# Patient Record
Sex: MALE | Race: WHITE | URBAN - METROPOLITAN AREA
[De-identification: names, ages, dates, MRNs, and addresses within clinical notes are randomized per-mention and may not be internally consistent; named-entity substitution may affect disease eponyms.]

---

## 2017-02-17 DIAGNOSIS — E78.49 OTHER HYPERLIPIDEMIA: Primary | ICD-10-CM

## 2017-02-17 RX ORDER — ATORVASTATIN CALCIUM 40 MG/1
40 TABLET, FILM COATED ORAL DAILY
Qty: 90 TABLET | Refills: 0 | Status: SHIPPED | OUTPATIENT
Start: 2017-02-17 | End: 2017-10-13

## 2017-02-21 RX ORDER — ATORVASTATIN CALCIUM 40 MG/1
TABLET, FILM COATED ORAL
Qty: 90 TABLET | Refills: 0 | Status: SHIPPED | OUTPATIENT
Start: 2017-02-21 | End: 2017-08-23 | Stop reason: SDUPTHER

## 2017-06-19 RX ORDER — ATORVASTATIN CALCIUM 40 MG/1
TABLET, FILM COATED ORAL
Qty: 90 TABLET | Refills: 0 | OUTPATIENT
Start: 2017-06-19

## 2017-08-24 RX ORDER — ATORVASTATIN CALCIUM 40 MG/1
40 TABLET, FILM COATED ORAL DAILY
Qty: 30 TABLET | Refills: 0 | Status: SHIPPED | OUTPATIENT
Start: 2017-08-24 | End: 2017-09-24 | Stop reason: SDUPTHER

## 2017-09-25 RX ORDER — ATORVASTATIN CALCIUM 40 MG/1
TABLET, FILM COATED ORAL
Qty: 30 TABLET | Refills: 0 | Status: SHIPPED | OUTPATIENT
Start: 2017-09-25 | End: 2017-10-13 | Stop reason: SDUPTHER

## 2017-10-13 ENCOUNTER — OFFICE VISIT (OUTPATIENT)
Dept: FAMILY MEDICINE CLINIC | Age: 62
End: 2017-10-13

## 2017-10-13 VITALS
BODY MASS INDEX: 25.23 KG/M2 | SYSTOLIC BLOOD PRESSURE: 128 MMHG | DIASTOLIC BLOOD PRESSURE: 86 MMHG | OXYGEN SATURATION: 98 % | HEIGHT: 66 IN | HEART RATE: 66 BPM | WEIGHT: 157 LBS

## 2017-10-13 DIAGNOSIS — E78.2 MIXED HYPERLIPIDEMIA: Primary | ICD-10-CM

## 2017-10-13 DIAGNOSIS — J06.9 VIRAL URI: ICD-10-CM

## 2017-10-13 DIAGNOSIS — Z12.11 COLON CANCER SCREENING: ICD-10-CM

## 2017-10-13 LAB
ALBUMIN SERPL-MCNC: 4.5 G/DL (ref 3.4–5)
ALP BLD-CCNC: 75 U/L (ref 40–129)
ALT SERPL-CCNC: 23 U/L (ref 10–40)
ANION GAP SERPL CALCULATED.3IONS-SCNC: 14 MMOL/L (ref 3–16)
AST SERPL-CCNC: 26 U/L (ref 15–37)
BASOPHILS ABSOLUTE: 0.1 K/UL (ref 0–0.2)
BASOPHILS RELATIVE PERCENT: 1 %
BILIRUB SERPL-MCNC: 0.8 MG/DL (ref 0–1)
BILIRUBIN DIRECT: <0.2 MG/DL (ref 0–0.3)
BILIRUBIN, INDIRECT: NORMAL MG/DL (ref 0–1)
BUN BLDV-MCNC: 16 MG/DL (ref 7–20)
CALCIUM SERPL-MCNC: 9.5 MG/DL (ref 8.3–10.6)
CHLORIDE BLD-SCNC: 105 MMOL/L (ref 99–110)
CHOLESTEROL, TOTAL: 173 MG/DL (ref 0–199)
CO2: 21 MMOL/L (ref 21–32)
CREAT SERPL-MCNC: 0.8 MG/DL (ref 0.8–1.3)
EOSINOPHILS ABSOLUTE: 0.1 K/UL (ref 0–0.6)
EOSINOPHILS RELATIVE PERCENT: 1.3 %
GFR AFRICAN AMERICAN: >60
GFR NON-AFRICAN AMERICAN: >60
GLUCOSE BLD-MCNC: 93 MG/DL (ref 70–99)
HCT VFR BLD CALC: 50 % (ref 40.5–52.5)
HDLC SERPL-MCNC: 40 MG/DL (ref 40–60)
HEMOGLOBIN: 16.5 G/DL (ref 13.5–17.5)
HEPATITIS C ANTIBODY INTERPRETATION: NORMAL
LDL CHOLESTEROL CALCULATED: 115 MG/DL
LYMPHOCYTES ABSOLUTE: 2.3 K/UL (ref 1–5.1)
LYMPHOCYTES RELATIVE PERCENT: 33.6 %
MCH RBC QN AUTO: 31.3 PG (ref 26–34)
MCHC RBC AUTO-ENTMCNC: 33 G/DL (ref 31–36)
MCV RBC AUTO: 94.7 FL (ref 80–100)
MONOCYTES ABSOLUTE: 0.5 K/UL (ref 0–1.3)
MONOCYTES RELATIVE PERCENT: 7.5 %
NEUTROPHILS ABSOLUTE: 3.8 K/UL (ref 1.7–7.7)
NEUTROPHILS RELATIVE PERCENT: 56.6 %
PDW BLD-RTO: 14.4 % (ref 12.4–15.4)
PLATELET # BLD: 199 K/UL (ref 135–450)
PMV BLD AUTO: 10.9 FL (ref 5–10.5)
POTASSIUM SERPL-SCNC: 4.6 MMOL/L (ref 3.5–5.1)
RBC # BLD: 5.28 M/UL (ref 4.2–5.9)
SODIUM BLD-SCNC: 140 MMOL/L (ref 136–145)
TOTAL CK: 108 U/L (ref 39–308)
TOTAL PROTEIN: 7.4 G/DL (ref 6.4–8.2)
TRIGL SERPL-MCNC: 89 MG/DL (ref 0–150)
VLDLC SERPL CALC-MCNC: 18 MG/DL
WBC # BLD: 6.7 K/UL (ref 4–11)

## 2017-10-13 PROCEDURE — 99214 OFFICE O/P EST MOD 30 MIN: CPT | Performed by: FAMILY MEDICINE

## 2017-10-13 RX ORDER — ATORVASTATIN CALCIUM 40 MG/1
40 TABLET, FILM COATED ORAL DAILY
Qty: 30 TABLET | Refills: 5 | Status: SHIPPED | OUTPATIENT
Start: 2017-10-13 | End: 2017-10-30 | Stop reason: SDUPTHER

## 2017-10-13 RX ORDER — FLUTICASONE PROPIONATE 50 MCG
2 SPRAY, SUSPENSION (ML) NASAL DAILY
Qty: 1 BOTTLE | Refills: 3 | Status: SHIPPED | OUTPATIENT
Start: 2017-10-13 | End: 2018-01-29

## 2017-10-13 ASSESSMENT — PATIENT HEALTH QUESTIONNAIRE - PHQ9
1. LITTLE INTEREST OR PLEASURE IN DOING THINGS: 0
SUM OF ALL RESPONSES TO PHQ QUESTIONS 1-9: 0
SUM OF ALL RESPONSES TO PHQ9 QUESTIONS 1 & 2: 0
2. FEELING DOWN, DEPRESSED OR HOPELESS: 0

## 2017-10-13 NOTE — PROGRESS NOTES
1 Bottle 3    atorvastatin (LIPITOR) 40 MG tablet Take 1 tablet by mouth daily 30 tablet 5    Probiotic Product (PROBIOTIC DAILY PO) Take 1 tablet by mouth daily. No current facility-administered medications for this visit. No Known Allergies        Objective:   Physical Exam   Vitals reviewed. Constitutional: He is oriented to person, place, and time. He appears well-developed and well-nourished. No distress. HENT:   Head: Normocephalic and atraumatic. Right Ear: External ear normal.   Left Ear: External ear normal.   Nose: Nose normal. Mild congestion   Mouth/Throat: Oropharynx is clear and moist. No oropharyngeal exudate. Eyes: Conjunctivae and extraocular motions are normal. Pupils are equal, round, and reactive to light. Right eye exhibits no discharge. Left eye exhibits no discharge. No scleral icterus. Neck: Normal range of motion. Neck supple. No JVD present. No tracheal deviation present. No thyromegaly present. Cardiovascular: Normal rate, regular rhythm, normal heart sounds and intact distal pulses. Exam reveals no gallop and no friction rub. No murmur heard. Pulmonary/Chest: Effort normal and breath sounds normal. No stridor. No respiratory distress. He has no wheezes. He has no rales. He exhibits no tenderness. Abdominal: Soft. Bowel sounds are normal. He exhibits no distension and no mass. No tenderness. He has no rebound and no guarding. Assessment:     1. Mixed hyperlipidemia  atorvastatin (LIPITOR) 40 MG tablet    Basic Metabolic Panel    Hepatic Function Panel    Lipid Panel    CK    Hepatitis C Antibody    HIV Screen   2. Colon cancer screening  [facility] - Mihir Rogers MD ([facility])   3.  Viral URI  fluticasone (FLONASE) 50 MCG/ACT nasal spray    CBC               Plan:   see orders, discussed healthier life style, portion control exercise

## 2017-10-16 LAB — HIV-1 AND HIV-2 ANTIBODIES: NORMAL

## 2017-10-30 DIAGNOSIS — E78.2 MIXED HYPERLIPIDEMIA: ICD-10-CM

## 2017-10-30 RX ORDER — ATORVASTATIN CALCIUM 40 MG/1
40 TABLET, FILM COATED ORAL DAILY
Qty: 90 TABLET | Refills: 1 | Status: SHIPPED | OUTPATIENT
Start: 2017-10-30 | End: 2018-08-04 | Stop reason: SDUPTHER

## 2018-01-11 ENCOUNTER — OFFICE VISIT (OUTPATIENT)
Dept: FAMILY MEDICINE CLINIC | Age: 63
End: 2018-01-11

## 2018-01-11 VITALS
SYSTOLIC BLOOD PRESSURE: 122 MMHG | RESPIRATION RATE: 12 BRPM | DIASTOLIC BLOOD PRESSURE: 84 MMHG | OXYGEN SATURATION: 97 % | TEMPERATURE: 99.6 F | HEART RATE: 84 BPM | WEIGHT: 159 LBS | HEIGHT: 66 IN | BODY MASS INDEX: 25.55 KG/M2

## 2018-01-11 DIAGNOSIS — J11.1 FLU SYNDROME: Primary | ICD-10-CM

## 2018-01-11 LAB
INFLUENZA A ANTIBODY: NORMAL
INFLUENZA B ANTIBODY: NORMAL

## 2018-01-11 PROCEDURE — 87804 INFLUENZA ASSAY W/OPTIC: CPT | Performed by: FAMILY MEDICINE

## 2018-01-11 PROCEDURE — 99214 OFFICE O/P EST MOD 30 MIN: CPT | Performed by: FAMILY MEDICINE

## 2018-01-11 RX ORDER — OSELTAMIVIR PHOSPHATE 75 MG/1
75 CAPSULE ORAL 2 TIMES DAILY
Qty: 10 CAPSULE | Refills: 0 | Status: SHIPPED | OUTPATIENT
Start: 2018-01-11 | End: 2018-01-16

## 2018-01-11 NOTE — LETTER
[address] [place] [address] [address] [facility] [address]  [address] [place]  Phone: [phone]  Fax: [phone]    Emmanuel Chadwick MD        January 11, 2018     Patient: Susan Farrar   YOB: 1955   Date of Visit: 1/11/2018       To Whom it May Concern:    Alva Miner was seen in my clinic on 1/11/2018. He may return to work on 1/15/18. If you have any questions or concerns, please don't hesitate to call.     Sincerely,         Emmanuel Chadwick MD

## 2018-01-22 ENCOUNTER — PATIENT MESSAGE (OUTPATIENT)
Dept: FAMILY MEDICINE CLINIC | Age: 63
End: 2018-01-22

## 2018-01-29 ENCOUNTER — OFFICE VISIT (OUTPATIENT)
Dept: FAMILY MEDICINE CLINIC | Age: 63
End: 2018-01-29

## 2018-01-29 VITALS
WEIGHT: 155.2 LBS | OXYGEN SATURATION: 98 % | HEART RATE: 71 BPM | SYSTOLIC BLOOD PRESSURE: 134 MMHG | BODY MASS INDEX: 25.05 KG/M2 | DIASTOLIC BLOOD PRESSURE: 96 MMHG

## 2018-01-29 DIAGNOSIS — L72.3 SEBACEOUS CYST: Primary | ICD-10-CM

## 2018-01-29 PROCEDURE — 99212 OFFICE O/P EST SF 10 MIN: CPT | Performed by: FAMILY MEDICINE

## 2018-01-29 ASSESSMENT — ENCOUNTER SYMPTOMS
RESPIRATORY NEGATIVE: 1
EYES NEGATIVE: 1
GASTROINTESTINAL NEGATIVE: 1

## 2018-08-04 DIAGNOSIS — E78.2 MIXED HYPERLIPIDEMIA: ICD-10-CM

## 2018-08-06 RX ORDER — ATORVASTATIN CALCIUM 40 MG/1
40 TABLET, FILM COATED ORAL DAILY
Qty: 30 TABLET | Refills: 2 | Status: SHIPPED | OUTPATIENT
Start: 2018-08-06 | End: 2019-01-20 | Stop reason: SDUPTHER

## 2019-01-20 DIAGNOSIS — E78.2 MIXED HYPERLIPIDEMIA: ICD-10-CM

## 2019-01-21 RX ORDER — ATORVASTATIN CALCIUM 40 MG/1
40 TABLET, FILM COATED ORAL DAILY
Qty: 30 TABLET | Refills: 2 | Status: SHIPPED | OUTPATIENT
Start: 2019-01-21 | End: 2019-02-25 | Stop reason: SDUPTHER

## 2019-02-25 ENCOUNTER — OFFICE VISIT (OUTPATIENT)
Dept: FAMILY MEDICINE CLINIC | Age: 64
End: 2019-02-25
Payer: COMMERCIAL

## 2019-02-25 VITALS
DIASTOLIC BLOOD PRESSURE: 84 MMHG | BODY MASS INDEX: 25.6 KG/M2 | SYSTOLIC BLOOD PRESSURE: 128 MMHG | HEART RATE: 78 BPM | WEIGHT: 158.6 LBS | RESPIRATION RATE: 14 BRPM | OXYGEN SATURATION: 97 %

## 2019-02-25 DIAGNOSIS — J06.9 VIRAL URI: Primary | ICD-10-CM

## 2019-02-25 DIAGNOSIS — E78.2 MIXED HYPERLIPIDEMIA: ICD-10-CM

## 2019-02-25 DIAGNOSIS — Z12.11 COLON CANCER SCREENING: ICD-10-CM

## 2019-02-25 LAB
ALBUMIN SERPL-MCNC: 4.5 G/DL (ref 3.4–5)
ALP BLD-CCNC: 68 U/L (ref 40–129)
ALT SERPL-CCNC: 24 U/L (ref 10–40)
ANION GAP SERPL CALCULATED.3IONS-SCNC: 13 MMOL/L (ref 3–16)
AST SERPL-CCNC: 31 U/L (ref 15–37)
BILIRUB SERPL-MCNC: 0.9 MG/DL (ref 0–1)
BILIRUBIN DIRECT: <0.2 MG/DL (ref 0–0.3)
BILIRUBIN, INDIRECT: NORMAL MG/DL (ref 0–1)
BUN BLDV-MCNC: 14 MG/DL (ref 7–20)
CALCIUM SERPL-MCNC: 9.7 MG/DL (ref 8.3–10.6)
CHLORIDE BLD-SCNC: 107 MMOL/L (ref 99–110)
CHOLESTEROL, TOTAL: 171 MG/DL (ref 0–199)
CO2: 23 MMOL/L (ref 21–32)
CREAT SERPL-MCNC: 0.9 MG/DL (ref 0.8–1.3)
GFR AFRICAN AMERICAN: >60
GFR NON-AFRICAN AMERICAN: >60
GLUCOSE BLD-MCNC: 100 MG/DL (ref 70–99)
HCT VFR BLD CALC: 46.3 % (ref 40.5–52.5)
HDLC SERPL-MCNC: 41 MG/DL (ref 40–60)
HEMOGLOBIN: 15.5 G/DL (ref 13.5–17.5)
LDL CHOLESTEROL CALCULATED: 112 MG/DL
MCH RBC QN AUTO: 30.7 PG (ref 26–34)
MCHC RBC AUTO-ENTMCNC: 33.4 G/DL (ref 31–36)
MCV RBC AUTO: 92.1 FL (ref 80–100)
PDW BLD-RTO: 13.6 % (ref 12.4–15.4)
PLATELET # BLD: 208 K/UL (ref 135–450)
PMV BLD AUTO: 10.2 FL (ref 5–10.5)
POTASSIUM SERPL-SCNC: 4.4 MMOL/L (ref 3.5–5.1)
RBC # BLD: 5.03 M/UL (ref 4.2–5.9)
SODIUM BLD-SCNC: 143 MMOL/L (ref 136–145)
TOTAL PROTEIN: 7.3 G/DL (ref 6.4–8.2)
TRIGL SERPL-MCNC: 90 MG/DL (ref 0–150)
VLDLC SERPL CALC-MCNC: 18 MG/DL
WBC # BLD: 5.5 K/UL (ref 4–11)

## 2019-02-25 PROCEDURE — 36415 COLL VENOUS BLD VENIPUNCTURE: CPT | Performed by: FAMILY MEDICINE

## 2019-02-25 PROCEDURE — 99213 OFFICE O/P EST LOW 20 MIN: CPT | Performed by: FAMILY MEDICINE

## 2019-02-25 RX ORDER — FLUTICASONE PROPIONATE 50 MCG
2 SPRAY, SUSPENSION (ML) NASAL DAILY
Qty: 1 BOTTLE | Refills: 1 | Status: SHIPPED | OUTPATIENT
Start: 2019-02-25 | End: 2019-06-24 | Stop reason: SDUPTHER

## 2019-02-25 RX ORDER — ATORVASTATIN CALCIUM 40 MG/1
40 TABLET, FILM COATED ORAL DAILY
Qty: 30 TABLET | Refills: 5 | Status: SHIPPED | OUTPATIENT
Start: 2019-02-25 | End: 2020-01-23

## 2019-02-25 ASSESSMENT — PATIENT HEALTH QUESTIONNAIRE - PHQ9
SUM OF ALL RESPONSES TO PHQ QUESTIONS 1-9: 0
1. LITTLE INTEREST OR PLEASURE IN DOING THINGS: 0
2. FEELING DOWN, DEPRESSED OR HOPELESS: 0
SUM OF ALL RESPONSES TO PHQ9 QUESTIONS 1 & 2: 0
SUM OF ALL RESPONSES TO PHQ QUESTIONS 1-9: 0

## 2019-03-05 ENCOUNTER — TELEPHONE (OUTPATIENT)
Dept: FAMILY MEDICINE CLINIC | Age: 64
End: 2019-03-05

## 2019-03-05 RX ORDER — AMOXICILLIN AND CLAVULANATE POTASSIUM 875; 125 MG/1; MG/1
1 TABLET, FILM COATED ORAL 2 TIMES DAILY
Qty: 20 TABLET | Refills: 0 | Status: SHIPPED | OUTPATIENT
Start: 2019-03-05 | End: 2019-03-15

## 2019-06-13 ENCOUNTER — TELEPHONE (OUTPATIENT)
Dept: FAMILY MEDICINE CLINIC | Age: 64
End: 2019-06-13

## 2019-06-13 NOTE — TELEPHONE ENCOUNTER
Pt is returning a call from [employer]. Please return his call at the earliest convenience.     LOV 2/25/19

## 2019-06-13 NOTE — TELEPHONE ENCOUNTER
Follow up on Cologuard     [phone]   call office, has patient received Cologuard, and returned to [facility]

## 2019-06-24 DIAGNOSIS — J06.9 VIRAL URI: ICD-10-CM

## 2019-06-24 RX ORDER — FLUTICASONE PROPIONATE 50 MCG
SPRAY, SUSPENSION (ML) NASAL
Qty: 1 BOTTLE | Refills: 1 | Status: SHIPPED | OUTPATIENT
Start: 2019-06-24

## 2019-06-24 NOTE — TELEPHONE ENCOUNTER
Medication:   Requested Prescriptions     Pending Prescriptions Disp Refills    fluticasone (FLONASE) 50 MCG/ACT nasal spray [Pharmacy Med Name: FLUTICASONE PROP 50 MCG SPRAY]  1     Sig: SPRAY 2 SPRAYS INTO EACH NOSTRIL EVERY DAY        Last Filled:      Patient Phone Number: [phone] (home) [phone] (work)    Last appt: 2/25/2019   Next appt: Visit date not found    Last OARRS: No flowsheet data found.     Preferred Pharmacy:   [facility]/pharmacy [place] [address], [address] [place] [address]  [address] [place] [address] [address] [place] [address] [address]  Phone: [phone] Fax: [phone]    [facility]pharmacy [facility] [address] [phone] [facility] [phone]  [facility] [address]  Phone: [phone] Fax: [phone]

## 2019-10-08 ENCOUNTER — OFFICE VISIT (OUTPATIENT)
Dept: ORTHOPEDIC SURGERY | Age: 64
End: 2019-10-08
Payer: COMMERCIAL

## 2019-10-08 VITALS — DIASTOLIC BLOOD PRESSURE: 78 MMHG | HEART RATE: 68 BPM | SYSTOLIC BLOOD PRESSURE: 135 MMHG

## 2019-10-08 DIAGNOSIS — M54.12 CERVICAL RADICULOPATHY AT C7: ICD-10-CM

## 2019-10-08 DIAGNOSIS — M54.2 CERVICAL PAIN: ICD-10-CM

## 2019-10-08 DIAGNOSIS — S16.1XXA CERVICAL STRAIN, ACUTE, INITIAL ENCOUNTER: ICD-10-CM

## 2019-10-08 DIAGNOSIS — M25.511 RIGHT SHOULDER PAIN, UNSPECIFIED CHRONICITY: ICD-10-CM

## 2019-10-08 DIAGNOSIS — M50.20 DISCOGENIC SYNDROME, CERVICAL: Primary | ICD-10-CM

## 2019-10-08 PROCEDURE — 99203 OFFICE O/P NEW LOW 30 MIN: CPT | Performed by: INTERNAL MEDICINE

## 2019-10-08 RX ORDER — PREDNISONE 50 MG/1
50 TABLET ORAL DAILY
Qty: 6 TABLET | Refills: 0 | Status: SHIPPED | OUTPATIENT
Start: 2019-10-08 | End: 2019-10-14

## 2019-10-08 RX ORDER — NAPROXEN 250 MG/1
TABLET ORAL
Refills: 0 | COMMUNITY
Start: 2019-10-02

## 2019-10-08 RX ORDER — TRAMADOL HYDROCHLORIDE 50 MG/1
50 TABLET ORAL EVERY 6 HOURS PRN
Qty: 20 TABLET | Refills: 0 | Status: SHIPPED | OUTPATIENT
Start: 2019-10-08 | End: 2019-10-15

## 2019-10-08 RX ORDER — CYCLOBENZAPRINE HCL 10 MG
10 TABLET ORAL NIGHTLY PRN
Qty: 30 TABLET | Refills: 1 | Status: SHIPPED | OUTPATIENT
Start: 2019-10-08 | End: 2020-01-07 | Stop reason: SDUPTHER

## 2019-10-11 DIAGNOSIS — M50.20 DISCOGENIC SYNDROME, CERVICAL: ICD-10-CM

## 2019-10-11 DIAGNOSIS — M54.12 CERVICAL RADICULOPATHY AT C7: Primary | ICD-10-CM

## 2019-10-11 DIAGNOSIS — S16.1XXA CERVICAL STRAIN, ACUTE, INITIAL ENCOUNTER: ICD-10-CM

## 2019-10-22 ENCOUNTER — OFFICE VISIT (OUTPATIENT)
Dept: ORTHOPEDIC SURGERY | Age: 64
End: 2019-10-22
Payer: COMMERCIAL

## 2019-10-22 ENCOUNTER — TELEPHONE (OUTPATIENT)
Dept: ORTHOPEDIC SURGERY | Age: 64
End: 2019-10-22

## 2019-10-22 DIAGNOSIS — M50.20 DISCOGENIC SYNDROME, CERVICAL: Primary | ICD-10-CM

## 2019-10-22 DIAGNOSIS — S16.1XXD CERVICAL STRAIN, SUBSEQUENT ENCOUNTER: ICD-10-CM

## 2019-10-22 DIAGNOSIS — M54.12 CERVICAL RADICULOPATHY AT C7: ICD-10-CM

## 2019-10-22 PROCEDURE — 99213 OFFICE O/P EST LOW 20 MIN: CPT | Performed by: INTERNAL MEDICINE

## 2019-10-22 RX ORDER — TRAMADOL HYDROCHLORIDE 50 MG/1
50 TABLET ORAL EVERY 6 HOURS PRN
Qty: 20 TABLET | Refills: 0 | Status: SHIPPED | OUTPATIENT
Start: 2019-10-22 | End: 2019-10-29

## 2019-10-25 DIAGNOSIS — S16.1XXD CERVICAL STRAIN, SUBSEQUENT ENCOUNTER: ICD-10-CM

## 2019-10-25 DIAGNOSIS — M54.12 CERVICAL RADICULOPATHY AT C7: Primary | ICD-10-CM

## 2019-10-25 DIAGNOSIS — M50.20 DISCOGENIC SYNDROME, CERVICAL: ICD-10-CM

## 2019-11-04 ENCOUNTER — TELEPHONE (OUTPATIENT)
Dept: ORTHOPEDIC SURGERY | Age: 64
End: 2019-11-04

## 2019-12-03 ENCOUNTER — TELEPHONE (OUTPATIENT)
Dept: ORTHOPEDIC SURGERY | Age: 64
End: 2019-12-03

## 2019-12-04 ENCOUNTER — OFFICE VISIT (OUTPATIENT)
Dept: ORTHOPEDIC SURGERY | Age: 64
End: 2019-12-04
Payer: COMMERCIAL

## 2019-12-04 VITALS — WEIGHT: 158.51 LBS | HEIGHT: 66 IN | BODY MASS INDEX: 25.47 KG/M2

## 2019-12-04 DIAGNOSIS — M50.20 DISCOGENIC SYNDROME, CERVICAL: ICD-10-CM

## 2019-12-04 DIAGNOSIS — M54.12 CERVICAL RADICULOPATHY AT C7: ICD-10-CM

## 2019-12-04 DIAGNOSIS — S16.1XXD CERVICAL STRAIN, SUBSEQUENT ENCOUNTER: Primary | ICD-10-CM

## 2019-12-04 PROCEDURE — 99213 OFFICE O/P EST LOW 20 MIN: CPT | Performed by: INTERNAL MEDICINE

## 2019-12-05 ENCOUNTER — HOSPITAL ENCOUNTER (OUTPATIENT)
Dept: PHYSICAL THERAPY | Age: 64
Setting detail: THERAPIES SERIES
Discharge: HOME | End: 2019-12-05
Payer: COMMERCIAL

## 2019-12-05 PROCEDURE — 97012 MECHANICAL TRACTION THERAPY: CPT

## 2019-12-05 PROCEDURE — 97161 PT EVAL LOW COMPLEX 20 MIN: CPT

## 2019-12-05 PROCEDURE — 97110 THERAPEUTIC EXERCISES: CPT

## 2019-12-05 PROCEDURE — 97140 MANUAL THERAPY 1/> REGIONS: CPT

## 2019-12-09 ENCOUNTER — HOSPITAL ENCOUNTER (OUTPATIENT)
Dept: PHYSICAL THERAPY | Age: 64
Setting detail: THERAPIES SERIES
Discharge: HOME | End: 2019-12-09
Payer: COMMERCIAL

## 2019-12-09 PROCEDURE — 97110 THERAPEUTIC EXERCISES: CPT

## 2019-12-09 PROCEDURE — 97012 MECHANICAL TRACTION THERAPY: CPT

## 2019-12-09 PROCEDURE — 97140 MANUAL THERAPY 1/> REGIONS: CPT

## 2019-12-12 ENCOUNTER — HOSPITAL ENCOUNTER (OUTPATIENT)
Dept: PHYSICAL THERAPY | Age: 64
Setting detail: THERAPIES SERIES
Discharge: HOME | End: 2019-12-12
Payer: COMMERCIAL

## 2019-12-12 PROCEDURE — 97140 MANUAL THERAPY 1/> REGIONS: CPT

## 2019-12-12 PROCEDURE — 97012 MECHANICAL TRACTION THERAPY: CPT

## 2019-12-16 ENCOUNTER — HOSPITAL ENCOUNTER (OUTPATIENT)
Dept: PHYSICAL THERAPY | Age: 64
Setting detail: THERAPIES SERIES
Discharge: HOME | End: 2019-12-16
Payer: COMMERCIAL

## 2019-12-16 PROCEDURE — 97140 MANUAL THERAPY 1/> REGIONS: CPT

## 2019-12-16 PROCEDURE — 97110 THERAPEUTIC EXERCISES: CPT

## 2019-12-16 PROCEDURE — 97012 MECHANICAL TRACTION THERAPY: CPT

## 2019-12-18 ENCOUNTER — HOSPITAL ENCOUNTER (OUTPATIENT)
Dept: PHYSICAL THERAPY | Age: 64
Setting detail: THERAPIES SERIES
Discharge: HOME | End: 2019-12-18
Payer: COMMERCIAL

## 2019-12-18 PROCEDURE — 97140 MANUAL THERAPY 1/> REGIONS: CPT

## 2019-12-18 PROCEDURE — 97110 THERAPEUTIC EXERCISES: CPT

## 2019-12-18 PROCEDURE — 97012 MECHANICAL TRACTION THERAPY: CPT

## 2019-12-31 ENCOUNTER — HOSPITAL ENCOUNTER (OUTPATIENT)
Dept: PHYSICAL THERAPY | Age: 64
Setting detail: THERAPIES SERIES
Discharge: HOME | End: 2019-12-31
Payer: COMMERCIAL

## 2019-12-31 PROCEDURE — 97140 MANUAL THERAPY 1/> REGIONS: CPT

## 2019-12-31 PROCEDURE — 97110 THERAPEUTIC EXERCISES: CPT

## 2019-12-31 PROCEDURE — 97012 MECHANICAL TRACTION THERAPY: CPT

## 2019-12-31 NOTE — FLOWSHEET NOTE
Seat Table slides/Wall Slides       Seated HH  Depression       No Money Lime green 2 10 Monitor L shoulder pain   Supine chin tucks  5\" 20    Cervical snag  1 10 To L only    Cat/camel  1 10    UT stretch  30\" 3 B   Scap retractions     Corner stretch  10\" 5x    Open books  1 10 B   1/2 foam roll pec stretch  2'     Thoracic extension over foam roller  1 10 2 positions   R wrist extensors stretch  HEP          Manual Intervention 20'       Shld /GH Mobs       Post Cap mobs       Thoracic PA mobs 5'      CT MT/Mobs 8'   sideglides & PA mobs at CTJ   Seated CT distraction manip 2'   L arm by side due to shoulder pain   Manual cervical traction 5'             NMR re-education       T-spine Ext       GH depres/compress       Yuridia Scap Bio       Scap/GH NMR       Body blade       Wall ball roll       Wall Ball bounce                  Therapeutic Exercise and NMR EXR  [x] (65041) Provided verbal/tactile cueing for activities related to strengthening, flexibility, endurance, ROM  for improvements in scapular, scapulothoracic and UE control with self care, reaching, carrying, lifting, house/yardwork, driving/computer work.    [] (39858) Provided verbal/tactile cueing for activities related to improving balance, coordination, kinesthetic sense, posture, motor skill, proprioception  to assist with  scapular, scapulothoracic and UE control with self care, reaching, carrying, lifting, house/yardwork, driving/computer work. Therapeutic Activities:    [] (72538 or 66429) Provided verbal/tactile cueing for activities related to improving balance, coordination, kinesthetic sense, posture, motor skill, proprioception and motor activation to allow for proper function of scapular, scapulothoracic and UE control with self care, carrying, lifting, driving/computer work.      Home Exercise Program:    [x] (14648) Reviewed/Progressed HEP activities related to strengthening, flexibility, endurance, ROM of scapular, scapulothoracic and UE control with self care, reaching, carrying, lifting, house/yardwork, driving/computer work  [] (85901) Reviewed/Progressed HEP activities related to improving balance, coordination, kinesthetic sense, posture, motor skill, proprioception of scapular, scapulothoracic and UE control with self care, reaching, carrying, lifting, house/yardwork, driving/computer work      Manual Treatments:  PROM / STM / Oscillations-Mobs:  G-I, II, III, IV (PA's, Inf., Post.)  [x] (96955) Provided manual therapy to mobilize soft tissue/joints of cervical/CT, scapular GHJ and UE for the purpose of modulating pain, promoting relaxation,  increasing ROM, reducing/eliminating soft tissue swelling/inflammation/restriction, improving soft tissue extensibility and allowing for proper ROM for normal function with self care, reaching, carrying, lifting, house/yardwork, driving/computer work    Modalities:  10' mechanical cervical traction - 15#/10#, 30\" hold/15\" release, 24 degrees    Charges:  Timed Code Treatment Minutes: 50   Total Treatment Minutes: 60       [] EVAL (LOW) 26927 (typically 20 minutes face-to-face)  [] EVAL (MOD) 15597 (typically 30 minutes face-to-face)  [] EVAL (HIGH) 56490 (typically 45 minutes face-to-face)  [] RE-EVAL     [x] VX(43049) x2     [] IONTO (21183)  [] NMR (86930) x     [] VASO (92628)  [x] Manual ([id number]) x 1    [] Other:  [] TA (16873)x     [x] Mech Traction (49999)  [] ES(attended) (03095)     [] ES (un) (20167): If [facility] Please Indicate Time In/Out  CPT Code Time in Time out   33301 7:00 7:20   38155 7:15 7:50   60810 7:50 8:00                 GOALS:  Patient stated goal: \"To be painfree and to have full movement\"  []? Progressing: []? Met: []? Not Met: []? Adjusted     Therapist goals for Patient:   Short Term Goals: To be achieved in: 2 weeks  1. Independent in HEP and progression per patient tolerance, in order to prevent re-injury. []? Progressing: []? Met: []? Not Met: []? Adjusted     2.  Patient

## 2020-01-02 ENCOUNTER — HOSPITAL ENCOUNTER (OUTPATIENT)
Dept: PHYSICAL THERAPY | Age: 65
Setting detail: THERAPIES SERIES
Discharge: HOME | End: 2020-01-02
Payer: COMMERCIAL

## 2020-01-02 PROCEDURE — 97140 MANUAL THERAPY 1/> REGIONS: CPT

## 2020-01-02 PROCEDURE — 97110 THERAPEUTIC EXERCISES: CPT

## 2020-01-02 PROCEDURE — 97012 MECHANICAL TRACTION THERAPY: CPT

## 2020-01-02 NOTE — FLOWSHEET NOTE
Depression       No Money Lime green 2 10 Monitor L shoulder pain   Supine chin tucks  5\" 20    Cervical snag  1 10 To L only    Cat/camel  1 10    UT stretch  30\" 3 B   Scap retractions     Corner stretch  10\" 5x    Open books  1 10 B   1/2 foam roll pec stretch  2'     Thoracic extension over foam roller  1 10 2 positions   R wrist extensors stretch  HEP   Thread the needle  1 10 In quadruped          Manual Intervention 20'       Shld /GH Mobs       Post Cap mobs       Thoracic PA mobs 5'      CT MT/Mobs 8'   sideglides & PA mobs at CTJ   Seated CT distraction manip 2'   L arm by side due to shoulder pain   Manual cervical traction 5'             NMR re-education       T-spine Ext       GH depres/compress       Yuridia Scap Bio       Scap/GH NMR       Body blade       Wall ball roll       Wall Ball bounce                  Therapeutic Exercise and NMR EXR  [x] (07495) Provided verbal/tactile cueing for activities related to strengthening, flexibility, endurance, ROM  for improvements in scapular, scapulothoracic and UE control with self care, reaching, carrying, lifting, house/yardwork, driving/computer work.    [] (86636) Provided verbal/tactile cueing for activities related to improving balance, coordination, kinesthetic sense, posture, motor skill, proprioception  to assist with  scapular, scapulothoracic and UE control with self care, reaching, carrying, lifting, house/yardwork, driving/computer work. Therapeutic Activities:    [] (17183 or 78091) Provided verbal/tactile cueing for activities related to improving balance, coordination, kinesthetic sense, posture, motor skill, proprioception and motor activation to allow for proper function of scapular, scapulothoracic and UE control with self care, carrying, lifting, driving/computer work.      Home Exercise Program:    [x] (93258) Reviewed/Progressed HEP activities related to strengthening, flexibility, endurance, ROM of scapular, scapulothoracic and UE control with self care, reaching, carrying, lifting, house/yardwork, driving/computer work  [] (89133) Reviewed/Progressed HEP activities related to improving balance, coordination, kinesthetic sense, posture, motor skill, proprioception of scapular, scapulothoracic and UE control with self care, reaching, carrying, lifting, house/yardwork, driving/computer work      Manual Treatments:  PROM / STM / Oscillations-Mobs:  G-I, II, III, IV (PA's, Inf., Post.)  [x] (68764) Provided manual therapy to mobilize soft tissue/joints of cervical/CT, scapular GHJ and UE for the purpose of modulating pain, promoting relaxation,  increasing ROM, reducing/eliminating soft tissue swelling/inflammation/restriction, improving soft tissue extensibility and allowing for proper ROM for normal function with self care, reaching, carrying, lifting, house/yardwork, driving/computer work    Modalities:  10' mechanical cervical traction - 15#/10#, 30\" hold/15\" release, 24 degrees    Charges:  Timed Code Treatment Minutes: 50   Total Treatment Minutes: 60       [] EVAL (LOW) 32564 (typically 20 minutes face-to-face)  [] EVAL (MOD) 71289 (typically 30 minutes face-to-face)  [] EVAL (HIGH) 68715 (typically 45 minutes face-to-face)  [] RE-EVAL     [x] LN(23727) x2     [] IONTO (59743)  [] NMR (34931) x     [] VASO (14014)  [x] Manual (48304) x 1    [] Other:  [] TA (12546)x     [x] Mech Traction (48939)  [] ES(attended) (72459)     [] ES (un) (30674): If [facility] Please Indicate Time In/Out  CPT Code Time in Time out   09081 2:00 2:20   18667 2:20 2:50   49781 2:50 3:00                 GOALS:  Patient stated goal: \"To be painfree and to have full movement\"  [x]? Progressing: []? Met: []? Not Met: []? Adjusted     Therapist goals for Patient:   Short Term Goals: To be achieved in: 2 weeks  1. Independent in HEP and progression per patient tolerance, in order to prevent re-injury. []? Progressing: [x]? Met: []? Not Met: []? Adjusted     2.  Patient

## 2020-01-07 ENCOUNTER — OFFICE VISIT (OUTPATIENT)
Dept: ORTHOPEDIC SURGERY | Age: 65
End: 2020-01-07
Payer: COMMERCIAL

## 2020-01-07 VITALS — WEIGHT: 158.51 LBS | HEIGHT: 66 IN | BODY MASS INDEX: 25.47 KG/M2

## 2020-01-07 PROCEDURE — 99213 OFFICE O/P EST LOW 20 MIN: CPT | Performed by: INTERNAL MEDICINE

## 2020-01-07 RX ORDER — CYCLOBENZAPRINE HCL 10 MG
10 TABLET ORAL NIGHTLY PRN
Qty: 30 TABLET | Refills: 1 | Status: SHIPPED | OUTPATIENT
Start: 2020-01-07 | End: 2021-01-11 | Stop reason: SDUPTHER

## 2020-01-07 NOTE — PROGRESS NOTES
Chief Complaint:   Chief Complaint   Patient presents with    Neck Pain     [facility] f/u neck/arm, doing better, PT helpful          History of Present Illness:       Patient is a 71 y.o. male returns follow up for the above complaint. The patient was last seen approximately 5 weeksago. The symptoms are improving since the last visit. The patient has had no further testing for the problem. He has completed 7 sessions of PT and is noted definite therapeutic benefit from this    There has been centralization of his symptoms and only transient brachialgia now. 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 with respect to the arm pain and neck pain    He estimates he is 70% improved overall    Denies any new onset or progressive weakness of the right upper extremity     Past Medical History:        Past Medical History:   Diagnosis Date    CERVICAL RADICULOPATHY     GERD (gastroesophageal reflux disease)     Hyperlipidemia     IBS (irritable bowel syndrome)         Present Medications:         Current Outpatient Medications   Medication Sig Dispense Refill    naproxen (NAPROSYN) 250 MG tablet TAKE 1 TABLET BY MOUTH TWICE A DAY AS NEEDED FOR PAIN  0    cyclobenzaprine (FLEXERIL) 10 MG tablet Take 1 tablet by mouth nightly as needed for Muscle spasms 30 tablet 1    fluticasone (FLONASE) 50 MCG/ACT nasal spray SPRAY 2 SPRAYS INTO EACH NOSTRIL EVERY DAY 1 Bottle 1    atorvastatin (LIPITOR) 40 MG tablet Take 1 tablet by mouth daily 30 tablet 5    Probiotic Product (PROBIOTIC DAILY PO) Take 1 tablet by mouth daily. No current facility-administered medications for this visit. Allergies:      No Known Allergies        Review of Systems:    Pertinent items are noted in HPI         Vital Signs: There were no vitals filed for this visit.      General Exam:     Constitutional: Patient is adequately groomed with no evidence of malnutrition    Physical Exam: Cervical neck      Primary Exam:    Inspection: No deformity atrophy appreciable curvature      Palpation: No focal trigger point tenderness      Range of Motion: Moderate restriction with extension mild restriction in all other ranges low-grade discomfort with endrange rotation-left      Strength: Normal upper extremity negative      Special Tests: Negative      Skin: There are no rashes, ulcerations or lesions. Gait: Nonantalgic     Neurologic -Light touch sensation and manual muscle testing is normal C5-C8 . Biceps and triceps reflexes are symmetric and +2. Additional Comments:        Additional Examinations:                   Office Imaging Results/Procedures PerformedToday:           Office Procedures:   No orders of the defined types were placed in this encounter. Other Outside Imaging and Testing Personally Reviewed:    No results found. Assessment   Impression: . Encounter Diagnoses   Name Primary?  Cervical strain, subsequent encounter Yes    Cervical radiculopathy at C7     Right cervical radiculopathy         Resolving cervical radiculopathy      Plan:     Continue NSAIDs OTC as needed-Motrin and muscle relaxant PRN  Continue/progress PT and eventual transition to I HEP if and extend PT as clinically indicated  Continue formal restrictions in the work setting and optimize ergonomics of his workstation as possible  Consider him a candidate for cervical spine intervention injection PRN if symptoms show no appreciable change or worsen         Orders:      No orders of the defined types were placed in this encounter. Aravind Guaman MD.      Disclaimer: \"This note was dictated with voice recognition software. Though review and correction are routine, we apologize for any errors. \"

## 2020-01-08 ENCOUNTER — HOSPITAL ENCOUNTER (OUTPATIENT)
Dept: PHYSICAL THERAPY | Age: 65
Setting detail: THERAPIES SERIES
Discharge: HOME | End: 2020-01-08
Payer: COMMERCIAL

## 2020-01-08 PROCEDURE — 97012 MECHANICAL TRACTION THERAPY: CPT

## 2020-01-08 PROCEDURE — 97140 MANUAL THERAPY 1/> REGIONS: CPT

## 2020-01-08 NOTE — FLOWSHEET NOTE
[employer]    Physical Therapy Treatment Note/ Progress Report:     Date:  2020    Patient Name:  Lee Gale    :  1955  MRN: [id number]  Restrictions/Precautions:    Medical/Treatment Diagnosis Information:  · Diagnosis: Cervical radiculopathy at C7 (M54.12), cervical discogenic syndrome (M50.20), cervical strain (S16.1XXD)  · Treatment Diagnosis: Cervical radiculopathy  Insurance/Certification information:  PT Insurance Information: Workers Compensation - 8 visits approved  Physician Information:  Referring Practitioner: Dr. Girish Alvarado of care signed (Y/N):     Date of Patient follow up with Physician:      Progress Report: [x]  Yes  []  No     Date Range for reporting period:  Beginnin20  Ending:      Progress report due (10 Rx/or 30 days whichever is less): 19     Recertification due (POC duration/ or 90 days whichever is less): 20     Visit # Insurance Allowable Auth Needed   8 8 visits approved [x]Yes    []No     Pain level:  3/10     SUBJECTIVE:  Pt reports about 70% improvement since starting PT. 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 He continues to have stiffness with cervical extension and L cervical rotation. Pt also has discomfort and soreness in L shoulder and R forearm.       OBJECTIVE: See eval   Observation:    Test measurements:     19: TTP R wrist extensors common origin at lateral epicondyle; (+) 3rd finger resistance test   20: Cervical AROM: flex = 50, ext = 35 \"tight\", SB = 30 R, 35 L, rotation = 50% L \"tight\", 90% R   NDI = 38% disability     RESTRICTIONS/PRECAUTIONS: R C7 radiculopathy     Exercises/Interventions: Held most there ex today due to pt time restrictions/meeting at work  Therapeutic Ex 5' Wt / Resistance Sets/sec Reps Notes   UBE       Cane AAROM flex/press       3 way Isomet       T- band Row T- band high of scapular, scapulothoracic and UE control with self care, reaching, carrying, lifting, house/yardwork, driving/computer work  [] (66209) Reviewed/Progressed HEP activities related to improving balance, coordination, kinesthetic sense, posture, motor skill, proprioception of scapular, scapulothoracic and UE control with self care, reaching, carrying, lifting, house/yardwork, driving/computer work      Manual Treatments:  PROM / STM / Oscillations-Mobs:  G-I, II, III, IV (PA's, Inf., Post.)  [x] (73013) Provided manual therapy to mobilize soft tissue/joints of cervical/CT, scapular GHJ and UE for the purpose of modulating pain, promoting relaxation,  increasing ROM, reducing/eliminating soft tissue swelling/inflammation/restriction, improving soft tissue extensibility and allowing for proper ROM for normal function with self care, reaching, carrying, lifting, house/yardwork, driving/computer work    Modalities:  10' mechanical cervical traction - 15#/10#, 30\" hold/15\" release, 24 degrees    Charges:  Timed Code Treatment Minutes: 30   Total Treatment Minutes: 40       [] EVAL (LOW) 10626 (typically 20 minutes face-to-face)  [] EVAL (MOD) 21902 (typically 30 minutes face-to-face)  [] EVAL (HIGH) 06348 (typically 45 minutes face-to-face)  [] RE-EVAL     [] NZ(23114) x     [] IONTO (67956)  [] NMR (40465) x     [] VASO (97930)  [x] Manual ([id number]) x 2    [] Other:  [] TA (57169)x     [x] [facility] Traction (25127)  [] ES(attended) (79071)     [] ES (un) (85495): If [facility] Please Indicate Time In/Out  CPT Code Time in Time out   52611 7:30 7:55   15929 7:55 8:00   41737 8:00 8:10                 GOALS:  Patient stated goal: \"To be painfree and to have full movement\"  [x]? Progressing: []? Met: []? Not Met: []? Adjusted     Therapist goals for Patient:   Short Term Goals: To be achieved in: 2 weeks  1. Independent in HEP and progression per patient tolerance, in order to prevent re-injury. []? Progressing: [x]? Met: []?  Not Met: []? Adjusted     2. Patient will have a decrease in pain to facilitate improvement in movement, function, and ADLs as indicated by Functional Deficits. []? Progressing: [x]? Met: []? Not Met: []? Adjusted     Long Term Goals: To be achieved in: 4-6 weeks  1. Disability index score of 42% or less for the NDI to assist with reaching prior level of function. [x]? Progressing: []? Met: []? Not Met: []? Adjusted  2. Patient will demonstrate increased AROM to [facility] of cervical/thoracic spine to allow for proper joint functioning as indicated by patients Functional Deficits. [x]? Progressing: []? Met: []? Not Met: []? Adjusted  3. Patient will demonstrate an increase in postural awareness and control and activation of  Deep cervical stabilizers to allow for proper functional mobility as indicated by patients Functional Deficits. [x]? Progressing: []? Met: []? Not Met: []? Adjusted  4. Patient will return to reaching overhead without increased symptoms or restriction. [x]? Progressing: []? Met: []? Not Met: []? Adjusted   5. Patient will be able to sit to do computer work for 1 hour without increased symptoms or restriction in order to perform work duties. []? Progressing: [x]? Met: []? Not Met: []? Adjusted     Progression Towards Functional goals:  [x] Patient is progressing as expected towards functional goals listed. [] Progression is slowed due to complexities listed. [] Progression has been slowed due to co-morbidities. [] Plan just implemented, too soon to assess goals progression  [] Other:     ASSESSMENT:  Held most exercises today due to pt time restrictions / having a meeting at work. Focused on manual therapy and stretching today. Pt demonstrates improved cervical AROM in all directions with decreased pain and stiffness noted especially with cervical extension and L cervical rotation. Pt continues to respond well to manual therapy.       Treatment/Activity Tolerance:  [x] Patient tolerated treatment well [] Patient limited by fatique  [] Patient limited by pain  [] Patient limited by other medical complications  [] Other:     Overall Progression Towards Functional goals/ Treatment Progress Update:  [] Patient is progressing as expected towards functional goals listed. [] Progression is slowed due to complexities/Impairments listed. [] Progression has been slowed due to co-morbidities. [x] Plan just implemented, too soon to assess goals progression <30days   [] Goals require adjustment due to lack of progress  [] Patient is not progressing as expected and requires additional follow up with physician  [] Other    Prognosis for POC: [x] Good [] Fair  [] Poor    Patient requires continued skilled intervention: [x] Yes  [] No      PLAN: Progress cervical/thoracic mobility and RTC/scapular strength as tolerated by pt. [x] Continue per plan of care [] Alter current plan (see comments)  [] Plan of care initiated [x] Hold pending [facility] approval for more visits [] Discharge    Electronically signed by: Archana Hawthorne, PT     Note: If patient does not return for scheduled/recommended follow up visits, this note will serve as a discharge from care along with the most recent update on progress.

## 2020-01-23 RX ORDER — ATORVASTATIN CALCIUM 40 MG/1
TABLET, FILM COATED ORAL
Qty: 30 TABLET | Refills: 5 | Status: SHIPPED | OUTPATIENT
Start: 2020-01-23 | End: 2020-08-03

## 2020-02-11 ENCOUNTER — OFFICE VISIT (OUTPATIENT)
Dept: ORTHOPEDIC SURGERY | Age: 65
End: 2020-02-11
Payer: COMMERCIAL

## 2020-02-11 VITALS — WEIGHT: 158.51 LBS | BODY MASS INDEX: 25.47 KG/M2 | HEIGHT: 66 IN

## 2020-02-11 PROCEDURE — 99213 OFFICE O/P EST LOW 20 MIN: CPT | Performed by: INTERNAL MEDICINE

## 2020-02-11 NOTE — PROGRESS NOTES
Chief Complaint:   Chief Complaint   Patient presents with    Neck Pain     improving, no more pain down into R arm, still some stiffness in neck, pain 2/10          History of Present Illness:       Patient is a 71 y.o. male returns follow up for the above complaint. The patient was last seen approximately 1 monthsago. The symptoms are improving since the last visit. The patient has had no further testing for the problem. Overall there is been centralization of his brachialgia and this is directly related to therapeutic benefits of physical therapy. No new injuries no new events no reliance on NSAIDs or muscle relaxants. Overall he is near his \"baseline\" respect his neck but he notes continued increase in baseline level of stiffness then he remembers having prior to work injury. He also notes increase in fatigue and muscle discomfort with overhead activities which is a change from \"baseline\" localizing to the neck and upper trapezius regions. He denies any new onset progressive weakness of the upper extremities. He is rates his level of discomfort as 2/10 severity on average    He does not perceive any new weakness     Past Medical History:        Past Medical History:   Diagnosis Date    CERVICAL RADICULOPATHY     GERD (gastroesophageal reflux disease)     Hyperlipidemia     IBS (irritable bowel syndrome)         Present Medications:         Current Outpatient Medications   Medication Sig Dispense Refill    atorvastatin (LIPITOR) 40 MG tablet TAKE 1 TABLET BY MOUTH EVERY DAY 30 tablet 5    cyclobenzaprine (FLEXERIL) 10 MG tablet Take 1 tablet by mouth nightly as needed for Muscle spasms 30 tablet 1    naproxen (NAPROSYN) 250 MG tablet TAKE 1 TABLET BY MOUTH TWICE A DAY AS NEEDED FOR PAIN  0    fluticasone (FLONASE) 50 MCG/ACT nasal spray SPRAY 2 SPRAYS INTO EACH NOSTRIL EVERY DAY 1 Bottle 1    Probiotic Product (PROBIOTIC DAILY PO) Take 1 tablet by mouth daily.        No current

## 2020-03-12 ENCOUNTER — TELEPHONE (OUTPATIENT)
Dept: ORTHOPEDIC SURGERY | Age: 65
End: 2020-03-12

## 2020-03-12 NOTE — TELEPHONE ENCOUNTER
WC SCANNED INTO MEDIA, QUESTION FROM INS REGARDING MMI/IMPAIRMENT RATING [facility] [employer]     Notified Alen ARREOLA/Dr. Ontiveros's clinic. Once notified the [employer] has been completed I will forward to Sumaya Hopkins.  Co.

## 2020-03-13 NOTE — TELEPHONE ENCOUNTER
Revised [id number] scanned into chart. Thanks and let me know if you need anything else from me.     Cordell Mcfarlane

## 2020-06-16 ENCOUNTER — TELEPHONE (OUTPATIENT)
Dept: ORTHOPEDIC SURGERY | Age: 65
End: 2020-06-16

## 2020-06-16 NOTE — TELEPHONE ENCOUNTER
GAVE [facility] MEDICAL RECORDS(EAM) FROM 1/1/19 TO 5/1/2020 TO [place] CHAUDHARY TO SCAN INTO [facility] FOR [employer]    FORWARDED REQUEST FOR BILLING TO [facility] BILLING DEPT VIA [employer]ICE MAIL

## 2020-08-03 RX ORDER — ATORVASTATIN CALCIUM 40 MG/1
TABLET, FILM COATED ORAL
Qty: 90 TABLET | Refills: 0 | Status: SHIPPED | OUTPATIENT
Start: 2020-08-03

## 2020-08-03 NOTE — TELEPHONE ENCOUNTER
Called patient. Let him know that he is due for an office visit. Due to his job and traveling he will call back to back an appt. Need #30 sent in to the pharm to get him through until his appt. Medication:   Requested Prescriptions     Pending Prescriptions Disp Refills    atorvastatin (LIPITOR) 40 MG tablet [Pharmacy Med Name: ATORVASTATIN 40 MG TABLET] 90 tablet 1     Sig: TAKE 1 TABLET BY MOUTH EVERY DAY       Last Filled:  1/23/20    Patient Phone Number: [phone] (home) [phone] (work)    Last appt: 2/25/19   Next appt: Visit date not found    Last Lipid:   Lab Results   Component Value Date    CHOL 171 02/25/2019    TRIG 90 02/25/2019    HDL 41 02/25/2019    HDL 39 12/28/2010    [address] 112 02/25/2019       Last OARRS: No flowsheet data found.     Preferred Pharmacy:   [facility]/pharmacy [address], [address] [facility] Elyssa Barreto [phone]  [address] [place] [address]  Phone: [phone] Fax: [phone]

## 2021-01-11 ENCOUNTER — OFFICE VISIT (OUTPATIENT)
Dept: ORTHOPEDIC SURGERY | Age: 66
End: 2021-01-11
Payer: COMMERCIAL

## 2021-01-11 VITALS — TEMPERATURE: 97.1 F | WEIGHT: 158.51 LBS | HEIGHT: 66 IN | BODY MASS INDEX: 25.47 KG/M2

## 2021-01-11 DIAGNOSIS — M50.223 OTHER CERVICAL DISC DISPLACEMENT AT C6-C7 LEVEL: ICD-10-CM

## 2021-01-11 DIAGNOSIS — M54.12 RADICULITIS OF RIGHT CERVICAL REGION: ICD-10-CM

## 2021-01-11 DIAGNOSIS — M50.20 DISCOGENIC SYNDROME, CERVICAL: Primary | ICD-10-CM

## 2021-01-11 PROCEDURE — 99214 OFFICE O/P EST MOD 30 MIN: CPT | Performed by: INTERNAL MEDICINE

## 2021-01-11 RX ORDER — METHYLPREDNISOLONE 4 MG/1
TABLET ORAL
Qty: 1 KIT | Refills: 0 | Status: SHIPPED | OUTPATIENT
Start: 2021-01-11

## 2021-01-11 RX ORDER — CYCLOBENZAPRINE HCL 10 MG
10 TABLET ORAL NIGHTLY PRN
Qty: 30 TABLET | Refills: 1 | Status: SHIPPED | OUTPATIENT
Start: 2021-01-11

## 2021-01-11 RX ORDER — MELOXICAM 15 MG/1
15 TABLET ORAL DAILY
Qty: 30 TABLET | Refills: 2 | Status: SHIPPED | OUTPATIENT
Start: 2021-01-11

## 2021-01-11 RX ORDER — TRAMADOL HYDROCHLORIDE 50 MG/1
50 TABLET ORAL EVERY 6 HOURS PRN
Qty: 20 TABLET | Refills: 0 | Status: SHIPPED | OUTPATIENT
Start: 2021-01-11 | End: 2021-01-18

## 2021-01-11 NOTE — PROGRESS NOTES
Chief Complaint:   Chief Complaint   Patient presents with    Neck Pain     UT right side pain, started back up over the summer    Arm Pain     right, pain in shld, upper arm and elbow with use/reach          History of Present Illness:       Patient is a 76 y.o. male presents with the above complaint. The symptoms began worsening in the interim since his last visit with us in February 2020. The worsening pattern of neck pain and brachialgia started without an injury. 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 The patient describes a aching pain that does radiate. The right arm symptoms are intermittent  and are are worsening since the onset. He initially presented to us on 10/2/2019  and symptoms started with an injury. He was lifting a machine to higher level position above shoulder level and felt acute pain and subjective \"pop\" about the lateral aspect of his neck on the right. He developed worsening pattern of neck pain and brachialgia involving the right upper extremity since that time he presents here for further evaluation and management. Physical examination at time was significant for weakness of the right triceps muscle and he was diagnosed with a C7 radiculopathy. Treatment included trial of steroids in addition to physical therapy and work-up included MRI evaluation. Unfortunately his neck pain brachialgia has resurfaced. The neck pain is location: Posterior and right-sided  severity: 3 out of 10 and is worsened by active range of motion.   Worsening of his neck pain is associated with right-sided brachialgia without radiation into the fingers The patient has not noted new onset or progressive weakness of the upper extremites but has noted decreased muscular endurance of the upper extremity. The neck pain : arm pain is 50 : 50 and follows a clear dermatomal pattern at this time. Treatment to date has included medication: OTC NSAIDs and symptoms have not improved with this treatment. The patient does not have history of neck trauma but recalls having an episode of neck pain sometime 10+ years ago that responded to physical therapy and conservative measures. He had no pain involving the neck or shoulder or limitations related to his neck prior to    The patient has no prior history of autoimmune disease, inflammatory arthropathy or crystal arthropathy. Past Medical History:        Past Medical History:   Diagnosis Date    CERVICAL RADICULOPATHY     GERD (gastroesophageal reflux disease)     Hyperlipidemia     IBS (irritable bowel syndrome)          Past Surgical History:   Procedure Laterality Date    HERNIA REPAIR Right 2/19/2014     OPEN RIGHT INGUINAL HERNIA WITH MESH      WISDOM TOOTH EXTRACTION           Present Medications:         Current Outpatient Medications   Medication Sig Dispense Refill    atorvastatin (LIPITOR) 40 MG tablet TAKE 1 TABLET BY MOUTH EVERY DAY 90 tablet 0    cyclobenzaprine (FLEXERIL) 10 MG tablet Take 1 tablet by mouth nightly as needed for Muscle spasms 30 tablet 1    naproxen (NAPROSYN) 250 MG tablet TAKE 1 TABLET BY MOUTH TWICE A DAY AS NEEDED FOR PAIN  0    fluticasone (FLONASE) 50 MCG/ACT nasal spray SPRAY 2 SPRAYS INTO EACH NOSTRIL EVERY DAY 1 Bottle 1    Probiotic Product (PROBIOTIC DAILY PO) Take 1 tablet by mouth daily. No current facility-administered medications for this visit.           Allergies:      No Known Allergies     Social History:         Social History     Socioeconomic History    Marital status:      Spouse name: Not on file    Number of children: Not on file  Years of education: Not on file    Highest education level: Not on file   Occupational History    Not on file   Social Needs    Financial resource strain: Not on file    Food insecurity     Worry: Not on file     Inability: Not on file    Transportation needs     Medical: Not on file     Non-medical: Not on file   Tobacco Use    Smoking status: Former Smoker     Packs/day: 1.00     Years: 20.00     Pack years: 20.00     Quit date: 1995     Years since quittin.7    Smokeless tobacco: Never Used   Substance and Sexual Activity    Alcohol use: No    Drug use: No    Sexual activity: Yes   Lifestyle    Physical activity     Days per week: Not on file     Minutes per session: Not on file    Stress: Not on file   Relationships    Social connections     Talks on phone: Not on file     Gets together: Not on file     Attends Restorationist service: Not on file     Active member of club or organization: Not on file     Attends meetings of clubs or organizations: Not on file     Relationship status: Not on file    Intimate partner violence     Fear of current or ex partner: Not on file     Emotionally abused: Not on file     Physically abused: Not on file     Forced sexual activity: Not on file   Other Topics Concern    Not on file   Social History Narrative    Not on file        Review of Symptoms:    Pertinent items are noted in HPI    Review of systems reviewed from Patient History Form dated on today's date and   available in the patient's chart under the Media tab. Vital Signs:      Vitals:    21 1419   Temp: 97.1 °F (36.2 °C)        General Exam:     Constitutional: Patient is adequately groomed with no evidence of malnutrition  Mental Status: The patient is oriented to time, place and person. The patient's mood and affect are appropriate. Vascular: Examination reveals no swelling or calf tenderness. Peripheral pulses are palpable and 2+. Lymphatics: no lymphadenopathy of the inguinal region or lower extremity      Physical Exam: Cervical neck      Primary Exam:    Inspection: No deformity atrophy appreciable curvature      Palpation: Mild tenderness and asymmetric tightness right upper trapezius and latera neck soft tissues-right       Range of Motion: Mild global restriction low-grade discomfort in all ranges      Strength: Normal upper extremity      Special Tests: Spurling sign negative on today's examination-right; negative Neelima sign      Skin: There are no rashes, ulcerations or lesions. Gait: Nonantalgic      Reflex intact lower     Additional Comments:        Additional Examinations:           Neurologic -Light touch sensation and manual muscle testing is normal C5-C8 . Biceps and triceps reflexes are symmetric and +2. Spurlling sign is negative. Office Imaging Results/Procedures PerformedToday:             Office Procedures:     Orders Placed This Encounter   Procedures    MRI CERVICAL SPINE WO CONTRAST     Standing Status:   Future     Standing Expiration Date:   1/11/2022     Scheduling Instructions:      Thiago Xiong, advised patient to schedule and F/U in office 2 days later. Please contact patient to schedule.      Order Specific Question:   Reason for exam:     Answer:   R/O HNP/Stenosis, C7 Radic Right side    [facility]     Referral Priority:   Routine     Referral Type:   Eval and Treat     Referral Reason:   Specialty Services Required     Requested Specialty:   Physical Therapy     Number of Visits Requested:   1           Other Outside Imaging and Testing Personally Reviewed:    MRI report was reviewed from 10/29/2019 Conclusions: #1 multilevel moderate spondylotic changes. Disc osteophyte complex C3/C4 through C5/C6. C5/C6 causing moderate foraminal and central canal stenosis, C6/C7 causing mild neuroforaminal/central canal stenosis, C4/C5 causing mild to moderate neuroforaminal stenosis, C3/C4 causing moderate neuroforaminal/central canal stenosis      Assessment   Impression: . Encounter Diagnoses   Name Primary?  Cervical radiculopathy at C7 Yes    Right cervical radiculopathy     Discogenic syndrome, cervical     Cervical strain, subsequent encounter               Plan: Active modification cervical disc protocol  Trial of steroids-Medrol Dosepak followed by meloxicam Multimodal management inclusive of muscle relaxant nightly as needed-Flexeril and. Use of tramadol short-term minimize use of narcotics  Commence/start formal course of PT cervical stabilization program trial of mechanical/cervical traction  Recommend MRI evaluation cervical spine evaluate for progression of disc disease C6/C7 or other levels    Overall it is my medical opinion with a high degree of medical certainty that the current symptoms of neck pain and brachialgia are the same quality and character as he has previously experienced related to his [facility] work injury involving the cervical spine. Fortunately he is not resenting with active motor radiculopathy on today's examination. Proceed as outlined above    The nature of the finding, probable diagnosis and likely treatment was thoroughly discussed with the patient. The options, risks, complications, alternative treatment as well as some of the differential diagnosis was discussed. The patient was thoroughly informed and all questions were answered. the patient indicated understanding and satisfaction with the discussion.       Orders:        Orders Placed This Encounter   Procedures    MRI CERVICAL SPINE WO CONTRAST     Standing Status:   Future     Standing Expiration Date:   1/11/2022 Scheduling Instructions:      Yuridia Narayanan, advised patient to schedule and F/U in office 2 days later. Please contact patient to schedule. Order Specific Question:   Reason for exam:     Answer:   R/O HNP/Stenosis, C7 Radic Right side    [facility]     Referral Priority:   Routine     Referral Type:   Eval and Treat     Referral Reason:   Specialty Services Required     Requested Specialty:   Physical Therapy     Number of Visits Requested:   1           Disclaimer: \"This note was dictated with voice recognition software. Though review and correction are routine, we apologize for any errors. \"

## 2021-01-19 ENCOUNTER — OFFICE VISIT (OUTPATIENT)
Dept: ORTHOPEDIC SURGERY | Age: 66
End: 2021-01-19
Payer: COMMERCIAL

## 2021-01-19 VITALS — HEIGHT: 66 IN | BODY MASS INDEX: 25.47 KG/M2 | TEMPERATURE: 98.1 F | WEIGHT: 158.51 LBS

## 2021-01-19 DIAGNOSIS — M50.222 DISPLACEMENT OF INTERVERTEBRAL DISC AT C5-C6 LEVEL: ICD-10-CM

## 2021-01-19 DIAGNOSIS — M50.20 DISCOGENIC SYNDROME, CERVICAL: ICD-10-CM

## 2021-01-19 DIAGNOSIS — M54.12 RADICULITIS OF RIGHT CERVICAL REGION: ICD-10-CM

## 2021-01-19 DIAGNOSIS — M54.12 CHRONIC CERVICAL RADICULOPATHY: ICD-10-CM

## 2021-01-19 PROCEDURE — 99213 OFFICE O/P EST LOW 20 MIN: CPT | Performed by: INTERNAL MEDICINE

## 2021-01-19 NOTE — PROGRESS NOTES
Chief Complaint:   Chief Complaint   Patient presents with    Neck Pain     feels better, steroids helped w/ache    Arm Pain     right, pain decreased, but arm feels weak/tired/\"funny\"          History of Present Illness:       Patient is a 76 y.o. male returns follow up for the above complaint. The patient was last seen approximately 1 weeksago. The symptoms are improving since the last visit. The patient has had further testing for the problem. In the interim MRI scan was completed which is outlined below    Overall at least mild therapeutic benefit from the trial of steroids-Medrol Dosepak    Pain levels 2/10 severity    He continues to experience intermittent stabbing/aching weakness type pain in the right brachium without radiation below the elbow. It does not perceive any new onset weakness or progressive weakness of the right upper extremity    He denies any lower limb symptoms    Neck pain to arm pain ratio 30:70     Past Medical History:        Past Medical History:   Diagnosis Date    CERVICAL RADICULOPATHY     GERD (gastroesophageal reflux disease)     Hyperlipidemia     IBS (irritable bowel syndrome)         Present Medications:         Current Outpatient Medications   Medication Sig Dispense Refill    cyclobenzaprine (FLEXERIL) 10 MG tablet Take 1 tablet by mouth nightly as needed for Muscle spasms 30 tablet 1    methylPREDNISolone (MEDROL, ARYAN,) 4 MG tablet By mouth. 1 kit 0    meloxicam (MOBIC) 15 MG tablet Take 1 tablet by mouth daily Start after completing Medrol 30 tablet 2    atorvastatin (LIPITOR) 40 MG tablet TAKE 1 TABLET BY MOUTH EVERY DAY 90 tablet 0    naproxen (NAPROSYN) 250 MG tablet TAKE 1 TABLET BY MOUTH TWICE A DAY AS NEEDED FOR PAIN  0    fluticasone (FLONASE) 50 MCG/ACT nasal spray SPRAY 2 SPRAYS INTO EACH NOSTRIL EVERY DAY 1 Bottle 1    Probiotic Product (PROBIOTIC DAILY PO) Take 1 tablet by mouth daily. No current facility-administered medications for this visit. Allergies:      No Known Allergies        Review of Systems:    Pertinent items are noted in HPI         Vital Signs:      Vitals:    01/19/21 1037   Temp: 98.1 °F (36.7 °C)        General Exam:     Constitutional: Patient is adequately groomed with no evidence of malnutrition    Physical Exam: Cervical neck      Primary Exam:    Inspection: No deformity atrophy appreciable curvature      Palpation: No focal trigger point tenderness      Range of Motion: Moderate restriction in extension mild in flexion, mild asymmetric restriction with rotation to the left as compared to right with only low-grade discomfort in all ranges      Strength: Normal upper extremity      Special Tests: Negative Mauricio's      Skin: There are no rashes, ulcerations or lesions. Gait: Nonantalgic      Reflex asymmetric hypoactive triceps reflex-right     Additional Comments:        Additional Examinations:             Neurologic -Light touch sensation and manual muscle testing is normal C5-C8 . Biceps +2 bilaterally and triceps reflexes +2 left hypoactive on the right. .         Office Imaging Results/Procedures PerformedToday:          Office Procedures:   No orders of the defined types were placed in this encounter.           Other Outside Imaging and Testing Personally Reviewed:    Mri Cervical Spine Wo Contrast    Result Date: 1/18/2021 Site: [facility] #: [id number] #: [id number] Procedure: MR Cervical Spine w/o Contrast ; Reason for Exam: Dx, cervical radiculopathy, cervical strain This document is confidential medical information. Unauthorized disclosure or use of this information is prohibited by law. If you are not the intended recipient of this document, please advise us by calling immediately [phone]. Modality [facility] [address] Patient Name: Josy Vega Case ID: [id number] Patient : 1955 Referring Physician: Nuno Bryant MD Exam Date: 01/15/2021 Exam Description: MR Cervical Spine w/o Contrast HISTORY:  Neck pain radiating down right arm. Work injury 15 months prior. TECHNICAL FACTORS:  Long- and short-axis fat- and water-weighted images were performed. COMPARISON:  None. FINDINGS:  Slight kyphosis is centered at C5. No inferior cerebellar tonsillar ectopia. Multilevel mild anterior mixed spondylotic disc displacements. Cervical discs are dehydrated. Mild peridens pannus gently effaces ventral dural sac. C2-3: 2mm anterolisthesis. Hypertrophic left facet arthropathy. Mild left foraminal narrowing. C3-4: Disc height reduction; vacuum phenomenon; 4mm retrolisthesis; shallow concentric disc displacement subtly flattens ventral cord margin. Moderate right foraminal stenosis. Modic 2 sterile reactive endplate change. C4-5: 2mm retrolisthesis; central and lateral disc protrusion slightly greater on right; subtly  deforms right ventral cord margin and right C5 nerve root. Moderate right lateral recess and moderate right foraminal stenosis. C5-6: Slight disc height reduction; vacuum phenomenon; 2mm retrolisthesis; concentric disc displacement, slightly asymmetric to right with right greater than left foraminal extension; subtly deforms right preforaminal cord margin; encroaches upon right C6 nerve root. Moderate right, mild left foraminal stenosis.  C6-7: Disc height reduction. Moderate right, mild left foraminal narrowing. C7-T1: Disc shows no posterior displacement. No intrinsic cervical cord lesion. No focal bone lesion to suggest fracture, infectious focus or neoplasm. CONCLUSION: 1. C3-4 low-grade retrolisthesis; shallow concentric disc displacement subtly flattening ventral cord margin. Moderate right foraminal stenosis. 2. C4-5 trace retrolisthesis; central and lateral protrusion, greater on right; subtly deforms right ventral cord margin and right C5 nerve root. Moderate right lateral recess and moderate right foraminal stenosis. 3. C5-6 trace retrolisthesis; concentric disc displacement asymmetric to right with right greater than left foraminal extension; subtly deforms right preforaminal cord margin; encroaching upon right C6 nerve root. Moderate right foraminal stenosis. Subtly deforms right  preforaminal cord margin. Thank you for the opportunity to provide your interpretation. Candis Díaz MD A: Mery 01/18/2021 7:05 AM T: SAMARIA 01/17/2021 1:51 PM              Assessment   Impression: . Encounter Diagnoses   Name Primary?  Displacement of intervertebral disc at C5-C6 level     Discogenic syndrome, cervical     Radiculitis of right cervical region     Chronic cervical radiculopathy         C7 sensorimotor chronic radiculopathy-asymmetric triceps reflex      Plan: Active modification cervical disc protocol  Formal course of PT cervical stabilization and traction trial  Consider him a candidate for cervical epidural injection as needed  Medical pain management-meloxicam daily GI precaution and as needed use of muscle relaxant nightly  No indication for surgical intervention at this time      No clinical signs of myelopathy. Active level of disease correlating with his current symptoms localized to the C5/C6 level. There is no evidence of compression of the right C7 nerve root.        Orders: No orders of the defined types were placed in this encounter. Nuno Bryant MD.      Disclaimer: \"This note was dictated with voice recognition software. Though review and correction are routine, we apologize for any errors. \"

## 2021-02-10 ENCOUNTER — TELEPHONE (OUTPATIENT)
Dept: ORTHOPEDIC SURGERY | Age: 66
End: 2021-02-10

## 2021-02-10 NOTE — TELEPHONE ENCOUNTER
GAVE [facility] [place] [facility] MEDICAL RECORDS ( EAM ) 06/01/2020 TO PRESENT TO KANCHAN CHAUDHARY TO SCAN IN MR[facility]  [address]  E-MAILED [facility]  REQUEST FOR BILLING RECORDS

## 2021-02-10 NOTE — TELEPHONE ENCOUNTER
Guerda Santiago, paperwork has been filled out, but Dr. Earle Mabry would like to know if payment was received with the letter as stated. Please confim, and I will scan into media.   Thanks

## 2021-02-12 NOTE — TELEPHONE ENCOUNTER
Tamiko,  Please see below messages. Dr. Karen Wilson would like to know if payment has been received along with the questionnaire, as stated on page 2 of the  letter.   Thank you

## 2021-02-12 NOTE — TELEPHONE ENCOUNTER
The request scanned was a FAX copy. Payment  would never come the [facility]  [facility]. Maybe check with Jennifer Osborn or call the 's office.

## 2021-03-24 ENCOUNTER — TELEPHONE (OUTPATIENT)
Dept: ORTHOPEDIC SURGERY | Age: 66
End: 2021-03-24

## 2021-03-24 NOTE — TELEPHONE ENCOUNTER
Other Spoke with Carola Aguayo from [facility] she would like to know if questionnaire has been done and if it can be faxed over to firm.  Ph. [phone] Fx. [phone]